# Patient Record
Sex: MALE | Race: WHITE | NOT HISPANIC OR LATINO | Employment: OTHER | URBAN - NONMETROPOLITAN AREA
[De-identification: names, ages, dates, MRNs, and addresses within clinical notes are randomized per-mention and may not be internally consistent; named-entity substitution may affect disease eponyms.]

---

## 2024-08-20 DIAGNOSIS — M25.561 ARTHRALGIA OF RIGHT KNEE: Primary | ICD-10-CM

## 2024-08-21 ENCOUNTER — OFFICE VISIT (OUTPATIENT)
Dept: ORTHOPEDIC SURGERY | Facility: CLINIC | Age: 64
End: 2024-08-21
Payer: MEDICARE

## 2024-08-21 VITALS
DIASTOLIC BLOOD PRESSURE: 90 MMHG | SYSTOLIC BLOOD PRESSURE: 138 MMHG | TEMPERATURE: 98.4 F | BODY MASS INDEX: 39.13 KG/M2 | HEIGHT: 68 IN | WEIGHT: 258.2 LBS

## 2024-08-21 DIAGNOSIS — S83.231A COMPLEX TEAR OF MEDIAL MENISCUS OF RIGHT KNEE AS CURRENT INJURY, INITIAL ENCOUNTER: Primary | ICD-10-CM

## 2024-08-21 DIAGNOSIS — M25.561 ARTHRALGIA OF RIGHT KNEE: ICD-10-CM

## 2024-08-21 DIAGNOSIS — S83.411A SPRAIN OF MEDIAL COLLATERAL LIGAMENT OF RIGHT KNEE, INITIAL ENCOUNTER: ICD-10-CM

## 2024-08-21 DIAGNOSIS — S83.511A SPRAIN OF ANTERIOR CRUCIATE LIGAMENT OF RIGHT KNEE, INITIAL ENCOUNTER: ICD-10-CM

## 2024-08-21 RX ORDER — GABAPENTIN 400 MG/1
1 CAPSULE ORAL 2 TIMES DAILY
COMMUNITY
Start: 2024-03-25

## 2024-08-21 RX ORDER — DONEPEZIL HYDROCHLORIDE 5 MG/1
2 TABLET, FILM COATED ORAL DAILY
COMMUNITY

## 2024-08-21 RX ORDER — LAMOTRIGINE 200 MG/1
TABLET ORAL
COMMUNITY
Start: 2024-07-10

## 2024-08-21 RX ORDER — CELECOXIB 200 MG/1
CAPSULE ORAL
COMMUNITY
Start: 2024-06-14 | End: 2024-08-21

## 2024-08-21 RX ORDER — METHYLPHENIDATE HYDROCHLORIDE 54 MG/1
TABLET ORAL
COMMUNITY
Start: 2002-09-14

## 2024-08-21 RX ORDER — AMITRIPTYLINE HYDROCHLORIDE 75 MG/1
1 TABLET ORAL DAILY
COMMUNITY
Start: 2016-07-17

## 2024-08-21 RX ORDER — RIZATRIPTAN BENZOATE 5 MG/1
TABLET ORAL
COMMUNITY
Start: 2024-07-10

## 2024-08-21 RX ORDER — METOPROLOL SUCCINATE 25 MG/1
1 TABLET, EXTENDED RELEASE ORAL DAILY
COMMUNITY

## 2024-08-21 RX ORDER — AMOXICILLIN 500 MG/1
CAPSULE ORAL
COMMUNITY

## 2024-08-21 RX ORDER — BUSPIRONE HYDROCHLORIDE 15 MG/1
1 TABLET ORAL 2 TIMES DAILY
COMMUNITY
Start: 1996-06-17

## 2024-08-21 RX ORDER — BUSPIRONE HYDROCHLORIDE 30 MG/1
1 TABLET ORAL 2 TIMES DAILY
COMMUNITY

## 2024-08-21 RX ORDER — MELOXICAM 7.5 MG/1
7.5 TABLET ORAL DAILY
Qty: 30 TABLET | Refills: 0 | Status: SHIPPED | OUTPATIENT
Start: 2024-08-21

## 2024-08-21 RX ADMIN — METHYLPREDNISOLONE ACETATE 40 MG: 40 INJECTION, SUSPENSION INTRA-ARTICULAR; INTRALESIONAL; INTRAMUSCULAR; SOFT TISSUE at 14:26

## 2024-08-21 RX ADMIN — LIDOCAINE HYDROCHLORIDE 2 ML: 20 INJECTION, SOLUTION INFILTRATION; PERINEURAL at 14:26

## 2024-08-21 NOTE — PROGRESS NOTES
Office Note     Name: Juvenal Johns    : 1960     MRN: [id number]     Chief Complaint  Pain of the Right Knee (Knee pain since first week of . Stepped in a hole in his backyard and twisted his knee. Has not had injection, has taken Celebrex, did not really provide relief. Tylenol at night helps some. )    Subjective     History of Present Illness:  Juvenal Johns is a 64 y.o. male presenting today for evaluation of right knee medial meniscus tear, ACL strain and MCL strain.  Patient states that in 2024 he stepped in a hole and twisted his knee.  At the time he was living in [place] and was treated for this in [place].  He was treated with NSAIDs and rest.  He has not tried physical therapy has not had an injection.  He does present with an MRI that was done in [place] that revealed a medial meniscus tear ACL sprain and MCL sprain.  He states over the past couple of months his symptoms have been slightly worsening and the NSAIDs are no longer helping much.  He notes occasional buckling of the knee.  He denies any paresthesias.  He denies any previous injuries to the affected area.    Review of Systems   Constitutional:  Negative for fever.   HENT:  Negative for dental problem and voice change.    Eyes:  Negative for visual disturbance.   Respiratory:  Negative for shortness of breath.    Cardiovascular:  Negative for chest pain.   Gastrointestinal:  Negative for abdominal pain.   Genitourinary:  Negative for dysuria.   Musculoskeletal:  Positive for arthralgias. Negative for gait problem and joint swelling.   Skin:  Negative for rash.   Neurological:  Negative for speech difficulty.   Hematological:  Does not bruise/bleed easily.   Psychiatric/Behavioral:  Negative for confusion.         Past Medical History:   Diagnosis Date    ADHD     Chronic headaches     Hypertension         Past Surgical History:   Procedure Laterality Date    PARTIAL KNEE ARTHROPLASTY Left     [facility]  "FUSION Right 2019    right great toe       No family history on file.    Social History     Socioeconomic History    Marital status:    Tobacco Use    Smoking status: Never         Current Outpatient Medications:     amitriptyline (ELAVIL) 75 MG tablet, Take 1 tablet by mouth Daily., Disp: , Rfl:     amoxicillin (AMOXIL) 500 MG capsule, , Disp: , Rfl:     busPIRone (BUSPAR) 15 MG tablet, Take 1 tablet by mouth 2 (Two) Times a Day., Disp: , Rfl:     busPIRone (BUSPAR) 30 MG tablet, Take 1 tablet by mouth 2 (Two) Times a Day., Disp: , Rfl:     donepezil (ARICEPT) 5 MG tablet, Take 2 tablets by mouth Daily., Disp: , Rfl:     gabapentin (NEURONTIN) 400 MG capsule, Take 1 capsule by mouth 2 (Two) Times a Day., Disp: , Rfl:     lamoTRIgine (LaMICtal) 200 MG tablet, Take 1 tablet every day by oral route in the morning for 90 days., Disp: , Rfl:     methylphenidate (Concerta) 54 MG CR tablet, Take 1 tablet every day by oral route for 34 days, for ADHD., Disp: , Rfl:     metoprolol succinate XL (TOPROL-XL) 25 MG 24 hr tablet, Take 1 tablet by mouth Daily., Disp: , Rfl:     rizatriptan (MAXALT) 5 MG tablet, Take 1 tablet as needed by oral route for 90 days., Disp: , Rfl:     tobramycin-dexAMETHasone (TobraDex) 0.3-0.1 % ophthalmic ointment, APPLY 1/2 INCH TO LOWER EYELID INTO LEFT EYE, Disp: , Rfl:     meloxicam (Mobic) 7.5 MG tablet, Take 1 tablet by mouth Daily., Disp: 30 tablet, Rfl: 0    No Known Allergies        Objective   /90   Temp 98.4 °F (36.9 °C)   Ht 172.7 cm (68\")   Wt 117 kg (258 lb 3.2 oz)   BMI 39.26 kg/m²    Class 2 Severe Obesity (BMI >=35 and <=39.9). Obesity-related health conditions include the following: hypertension and osteoarthritis. Obesity is improving with lifestyle modifications. BMI is is above average; BMI management plan is completed. We discussed low calorie, low carb based diet program, portion control, increasing exercise, and joining a fitness center or start home based " exercise program.       Physical Exam  Musculoskeletal:      Right knee: No effusion.      Instability Tests: Medial Oscar test positive. Lateral Oscar test negative.       Right Knee Exam     Tenderness   The patient is experiencing tenderness in the MCL, medial joint line and medial retinaculum.    Range of Motion   The patient has normal right knee ROM.    Tests   Oscar:  Medial - positive Lateral - negative  Varus: negative Valgus: negative  Lachman:  Anterior - negative    Posterior - negative  Drawer:  Anterior - negative    Posterior - negative    Other   Erythema: absent  Sensation: normal  Pulse: present  Swelling: none  Effusion: no effusion present    Comments:  Medial Oscar positive for pain without click, no mechanical locking.  No significant ligamentous laxity noted.           Extremity DVT signs are negative by clinical screen.     Independent Review of Radiographic Studies:    2 view plain films of the right knee were done in office today for the evaluation of pain and joint condition, no comparison views available.  No acute osseous abnormalities are seen.  There is moderate joint space narrowing of the medial and patellofemoral compartment.  No osteophytes in the medial compartment.  Mild osteophyte formation is noted on the superior and inferior poles of the patella.    Large Joint Arthrocentesis: R knee  Date/Time: 8/21/2024 2:26 PM  Consent given by: patient  Site marked: site marked  Timeout: Immediately prior to procedure a time out was called to verify the correct patient, procedure, equipment, support staff and site/side marked as required   Supporting Documentation  Indications: pain   Procedure Details  Location: knee - R knee  Preparation: Patient was prepped and draped in the usual sterile fashion  Needle size: 22 G  Approach: anterolateral  Medications administered: 40 mg methylPREDNISolone acetate 40 MG/ML; 2 mL lidocaine 2%  Patient tolerance: patient tolerated the  procedure well with no immediate complications          Assessment and Plan   Diagnoses and all orders for this visit:    1. Complex tear of medial meniscus of right knee as current injury, initial encounter (Primary)    2. Arthralgia of right knee    3. Sprain of medial collateral ligament of right knee, initial encounter  -     Ambulatory Referral to Physical Therapy for Evaluation & Treatment  -     meloxicam (Mobic) 7.5 MG tablet; Take 1 tablet by mouth Daily.  Dispense: 30 tablet; Refill: 0    4. Sprain of anterior cruciate ligament of right knee, initial encounter  -     Ambulatory Referral to Physical Therapy for Evaluation & Treatment  -     meloxicam (Mobic) 7.5 MG tablet; Take 1 tablet by mouth Daily.  Dispense: 30 tablet; Refill: 0    Other orders  -     Large Joint Arthrocentesis: R knee       Discussion of orthopedic goals  Orthopedic activities reviewed and patient expressed appreciation  Regular exercise as tolerated  Risk, benefits, and merits of treatment alternatives reviewed with the patient and questions answered    Recommendations/Plan:  Exercise, medications, injections, other patient advice, and return appointment as noted.  Patient is encouraged to call or return for any issues or concerns.    Patient was given a cortisone injection into the right knee today for symptomatic relief.  Given physical therapy order for medial meniscus tear and MCL sprain and ACL sprain.  Prescribed Mobic to take as needed for pain.  RICE  Follow-up in 6 weeks if no improvement or 3 months if symptoms improve with therapy and injection.  May repeat injection in 3 months if requested.    Return in about 3 months (around 11/21/2024) for Recheck R knee.  Patient agreeable to call or return sooner for any concerns.

## 2024-08-22 RX ORDER — METHYLPREDNISOLONE ACETATE 40 MG/ML
40 INJECTION, SUSPENSION INTRA-ARTICULAR; INTRALESIONAL; INTRAMUSCULAR; SOFT TISSUE
Status: COMPLETED | OUTPATIENT
Start: 2024-08-21 | End: 2024-08-21

## 2024-08-22 RX ORDER — LIDOCAINE HYDROCHLORIDE 20 MG/ML
2 INJECTION, SOLUTION INFILTRATION; PERINEURAL
Status: COMPLETED | OUTPATIENT
Start: 2024-08-21 | End: 2024-08-21